# Patient Record
Sex: FEMALE | Race: WHITE | NOT HISPANIC OR LATINO | Employment: FULL TIME | URBAN - METROPOLITAN AREA
[De-identification: names, ages, dates, MRNs, and addresses within clinical notes are randomized per-mention and may not be internally consistent; named-entity substitution may affect disease eponyms.]

---

## 2018-07-11 ENCOUNTER — OFFICE VISIT (OUTPATIENT)
Dept: FAMILY MEDICINE | Facility: CLINIC | Age: 23
End: 2018-07-11
Payer: COMMERCIAL

## 2018-07-11 VITALS
TEMPERATURE: 99 F | BODY MASS INDEX: 30.66 KG/M2 | DIASTOLIC BLOOD PRESSURE: 80 MMHG | HEART RATE: 78 BPM | SYSTOLIC BLOOD PRESSURE: 130 MMHG | OXYGEN SATURATION: 98 % | WEIGHT: 173.06 LBS | HEIGHT: 63 IN

## 2018-07-11 DIAGNOSIS — D84.9 IMMUNODEFICIENCY: Primary | ICD-10-CM

## 2018-07-11 PROCEDURE — 99999 PR PBB SHADOW E&M-EST. PATIENT-LVL III: CPT | Mod: PBBFAC,,, | Performed by: FAMILY MEDICINE

## 2018-07-11 PROCEDURE — 90471 IMMUNIZATION ADMIN: CPT | Mod: S$GLB,,, | Performed by: FAMILY MEDICINE

## 2018-07-11 PROCEDURE — 99395 PREV VISIT EST AGE 18-39: CPT | Mod: 25,S$GLB,, | Performed by: FAMILY MEDICINE

## 2018-07-11 PROCEDURE — 90746 HEPB VACCINE 3 DOSE ADULT IM: CPT | Mod: S$GLB,,, | Performed by: FAMILY MEDICINE

## 2018-07-11 PROCEDURE — 90707 MMR VACCINE SC: CPT | Mod: S$GLB,,, | Performed by: FAMILY MEDICINE

## 2018-07-11 PROCEDURE — 90472 IMMUNIZATION ADMIN EACH ADD: CPT | Mod: S$GLB,,, | Performed by: FAMILY MEDICINE

## 2018-07-11 NOTE — PROGRESS NOTES
Subjective:       Patient ID: Frankie Gallagherpper is a 22 y.o. female.    Chief Complaint: Immunizations and Paperwork    HPI  Review of Systems    Objective:      Physical Exam    Assessment:       1. Immunodeficiency        Plan:       ***

## 2018-07-11 NOTE — PROGRESS NOTES
Subjective:       Patient ID: Frankie Smyth is a 22 y.o. female.    Chief Complaint: Immunizations and Paperwork    PATIENT IS HER FOR NURSING  PHYSICAL AND PLANS TO ATTEND [employer]. SHE HAD TITERS DRAWN, BUT HER PCP DIDN'T HAVE THE VACCINES AVAILABLE FOR HER TO RECEIVE FOR HEPATITIS B AND MMR AS HER TITERS SHOWED SHE WAS NOT IMMUNE.     SHE HAS NO COMPLAINTS TODAY.   History reviewed. No pertinent past medical history.   History reviewed. No pertinent surgical history.  Review of patient's family history indicates:  Problem: Anxiety disorder      Relation: Mother       Age of Onset: (Not Specified)   Problem: Depression      Relation: Mother       Age of Onset: (Not Specified)   Problem: Hypertension      Relation: Mother       Age of Onset: (Not Specified)   Problem: Depression      Relation: Brother       Age of Onset: (Not Specified)   Problem: Breast cancer      Relation: Neg Hx       Age of Onset: (Not Specified)   Problem: Colon cancer      Relation: Neg Hx       Age of Onset: (Not Specified)   Problem: Ovarian cancer      Relation: Neg Hx       Age of Onset: (Not Specified)     Social History    Marital status: Single              Spouse name:                       Years of education:                 Number of children:               Occupational History  Occupation          Employer            Comment                                                       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   Respiratory: Negative for cough, chest tightness and shortness of breath.    Cardiovascular: Negative for chest pain, palpitations and leg swelling.   Gastrointestinal: Negative for abdominal pain.   Neurological: Negative for dizziness, syncope, light-headedness and headaches.       Objective:       ,  Vitals:    07/11/18 1159   BP: 130/80   Pulse: 78   Temp: 99 °F (37.2 °C)   TempSrc: Oral   SpO2: 98%   Weight: 78.5 kg (173 lb 1 oz)   Height: 5' 3" (1.6 m)       Physical Exam   Constitutional: She appears well-developed and well-nourished. No distress.   HENT:   Head: Normocephalic.   Right Ear: External ear normal.   Left Ear: External ear normal.   Mouth/Throat: Oropharynx is clear and moist. No oropharyngeal exudate.   Eyes: Conjunctivae are normal.   Neck: Normal range of motion. Neck supple. No thyromegaly present.   Cardiovascular: Normal rate, regular rhythm and normal heart sounds.  Exam reveals no gallop and no friction rub.    No murmur heard.  Pulmonary/Chest: Effort normal and breath sounds normal. No respiratory distress. She has no wheezes. She has no rales.   Abdominal: Soft. Bowel sounds are normal. She exhibits no distension and no mass. There is no tenderness. There is no rebound and no guarding.   Musculoskeletal: She exhibits no edema.   NORMAL GAIT   Lymphadenopathy:     She has no cervical adenopathy.   Neurological: She is alert.   Skin: No rash noted. She is not diaphoretic.   Psychiatric: She has a normal mood and affect.       Assessment:       1. Immunodeficiency        Plan:       Frankie was seen today for immunizations and paperwork.    Diagnoses and all orders for this visit:    Immunodeficiency  -     (In Office Administered) MMR Vaccine (SQ)  -     (In Office Administered) Hepatitis B Vaccine (Adult) (IM)    SHE NEEDS MMR AND HEP B SHOT TODAY DUE TO LOW TITERS       "

## 2018-08-10 ENCOUNTER — CLINICAL SUPPORT (OUTPATIENT)
Dept: FAMILY MEDICINE | Facility: CLINIC | Age: 23
End: 2018-08-10
Payer: COMMERCIAL

## 2018-08-10 DIAGNOSIS — Z23 NEED FOR HEPATITIS B VACCINATION: Primary | ICD-10-CM

## 2018-08-10 PROCEDURE — 90746 HEPB VACCINE 3 DOSE ADULT IM: CPT | Mod: S$GLB,,, | Performed by: FAMILY MEDICINE

## 2018-08-10 PROCEDURE — 90471 IMMUNIZATION ADMIN: CPT | Mod: S$GLB,,, | Performed by: FAMILY MEDICINE

## 2018-08-10 NOTE — PROGRESS NOTES
Administered Hepatitis B vaccine dose #2 IM to left deltoid.  Patient tolerated injection well, no adverse reactions noted. Patient advised to return for next dose. Patient verbalized understanding.

## 2018-08-16 ENCOUNTER — TELEPHONE (OUTPATIENT)
Dept: FAMILY MEDICINE | Facility: CLINIC | Age: 23
End: 2018-08-16

## 2018-08-16 NOTE — TELEPHONE ENCOUNTER
----- Message from Gerson Boo sent at 8/16/2018  3:50 PM CDT -----  Contact: self  Pt called to schedule her 3rd dose of Hep B injection. Contact pt at [phone].    Thanks-

## 2018-08-16 NOTE — TELEPHONE ENCOUNTER
3rd dose due 01/11/19, unable to schedule so far our.  Advised to call back at a late date to schedule. Patient verbalized understanding.

## 2019-01-11 ENCOUNTER — CLINICAL SUPPORT (OUTPATIENT)
Dept: FAMILY MEDICINE | Facility: CLINIC | Age: 24
End: 2019-01-11
Payer: COMMERCIAL

## 2019-01-11 DIAGNOSIS — Z23 NEED FOR HEPATITIS B VACCINATION: Primary | ICD-10-CM

## 2019-01-11 PROCEDURE — 99499 NO LOS: ICD-10-PCS | Mod: S$GLB,,, | Performed by: FAMILY MEDICINE

## 2019-01-11 PROCEDURE — 90471 HEPATITIS B VACCINE ADULT IM: ICD-10-PCS | Mod: S$GLB,,, | Performed by: FAMILY MEDICINE

## 2019-01-11 PROCEDURE — 99499 UNLISTED E&M SERVICE: CPT | Mod: S$GLB,,, | Performed by: FAMILY MEDICINE

## 2019-01-11 PROCEDURE — 90746 HEPB VACCINE 3 DOSE ADULT IM: CPT | Mod: S$GLB,,, | Performed by: FAMILY MEDICINE

## 2019-01-11 PROCEDURE — 90746 HEPATITIS B VACCINE ADULT IM: ICD-10-PCS | Mod: S$GLB,,, | Performed by: FAMILY MEDICINE

## 2019-01-11 PROCEDURE — 90471 IMMUNIZATION ADMIN: CPT | Mod: S$GLB,,, | Performed by: FAMILY MEDICINE

## 2019-01-11 NOTE — PROGRESS NOTES
Subjective:       Patient ID: Frankie Mayer is a 23 y.o. female.    Chief Complaint: Immunizations    HPI  Review of Systems    Objective:       There were no vitals filed for this visit.    Physical Exam    Assessment:       1. Need for hepatitis B vaccination        Plan:       Frankie was seen today for immunizations.    Diagnoses and all orders for this visit:    Need for hepatitis B vaccination    Other orders  -     (In Office Administered) Hepatitis B Vaccine (Adult) (IM)    \  Hep B order placed

## 2019-01-16 ENCOUNTER — TELEPHONE (OUTPATIENT)
Dept: FAMILY MEDICINE | Facility: CLINIC | Age: 24
End: 2019-01-16

## 2019-01-16 DIAGNOSIS — Z01.84 IMMUNITY TO HEPATITIS B VIRUS DEMONSTRATED BY SEROLOGIC TEST: Primary | ICD-10-CM

## 2019-01-16 NOTE — TELEPHONE ENCOUNTER
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 Please place orders and call to advise. Thank you.

## 2019-02-16 ENCOUNTER — LAB VISIT (OUTPATIENT)
Dept: LAB | Facility: HOSPITAL | Age: 24
End: 2019-02-16
Attending: FAMILY MEDICINE
Payer: COMMERCIAL

## 2019-02-16 DIAGNOSIS — Z01.84 IMMUNITY TO HEPATITIS B VIRUS DEMONSTRATED BY SEROLOGIC TEST: ICD-10-CM

## 2019-02-16 PROCEDURE — 86706 HEP B SURFACE ANTIBODY: CPT

## 2019-02-16 PROCEDURE — 36415 COLL VENOUS BLD VENIPUNCTURE: CPT | Mod: PO

## 2019-02-18 LAB — HBV SURFACE AB SER-ACNC: POSITIVE M[IU]/ML

## 2019-11-04 ENCOUNTER — OFFICE VISIT (OUTPATIENT)
Dept: FAMILY MEDICINE | Facility: CLINIC | Age: 24
End: 2019-11-04
Payer: COMMERCIAL

## 2019-11-04 VITALS
HEIGHT: 63 IN | OXYGEN SATURATION: 99 % | TEMPERATURE: 98 F | HEART RATE: 75 BPM | WEIGHT: 167.56 LBS | BODY MASS INDEX: 29.69 KG/M2 | SYSTOLIC BLOOD PRESSURE: 126 MMHG | DIASTOLIC BLOOD PRESSURE: 70 MMHG

## 2019-11-04 DIAGNOSIS — T78.40XA ALLERGIC REACTION, INITIAL ENCOUNTER: Primary | ICD-10-CM

## 2019-11-04 PROCEDURE — 99214 PR OFFICE/OUTPT VISIT, EST, LEVL IV, 30-39 MIN: ICD-10-PCS | Mod: S$GLB,,, | Performed by: PHYSICIAN ASSISTANT

## 2019-11-04 PROCEDURE — 3008F BODY MASS INDEX DOCD: CPT | Mod: CPTII,S$GLB,, | Performed by: PHYSICIAN ASSISTANT

## 2019-11-04 PROCEDURE — 99214 OFFICE O/P EST MOD 30 MIN: CPT | Mod: S$GLB,,, | Performed by: PHYSICIAN ASSISTANT

## 2019-11-04 PROCEDURE — 99999 PR PBB SHADOW E&M-EST. PATIENT-LVL IV: ICD-10-PCS | Mod: PBBFAC,,, | Performed by: PHYSICIAN ASSISTANT

## 2019-11-04 PROCEDURE — 3008F PR BODY MASS INDEX (BMI) DOCUMENTED: ICD-10-PCS | Mod: CPTII,S$GLB,, | Performed by: PHYSICIAN ASSISTANT

## 2019-11-04 PROCEDURE — 99999 PR PBB SHADOW E&M-EST. PATIENT-LVL IV: CPT | Mod: PBBFAC,,, | Performed by: PHYSICIAN ASSISTANT

## 2019-11-04 RX ORDER — HYDROXYZINE HYDROCHLORIDE 25 MG/1
25 TABLET, FILM COATED ORAL 3 TIMES DAILY PRN
Qty: 30 TABLET | Refills: 0 | Status: SHIPPED | OUTPATIENT
Start: 2019-11-04 | End: 2019-12-04

## 2019-11-04 RX ORDER — DESLORATADINE 5 MG/1
5 TABLET ORAL DAILY
Qty: 30 TABLET | Refills: 11 | Status: SHIPPED | OUTPATIENT
Start: 2019-11-04 | End: 2022-01-26

## 2019-11-04 RX ORDER — METHYLPREDNISOLONE 4 MG/1
TABLET ORAL
Qty: 1 PACKAGE | Refills: 0 | Status: SHIPPED | OUTPATIENT
Start: 2019-11-04 | End: 2022-01-26

## 2019-11-04 NOTE — PROGRESS NOTES
Subjective:       Patient ID: Frankie Smyth is a 24 y.o. female with multiple medical diagnoses as listed in the medical history and problem list that presents for Otalgia  .    Chief Complaint: Otalgia      Otalgia    There is pain in both ears. This is a new problem. The current episode started in the past 7 days (saturday ). The problem has been unchanged. Associated symptoms include hearing loss (muffled left ear ) and a rash. Pertinent negatives include no coughing, diarrhea, ear discharge, rhinorrhea, sore throat or vomiting. Associated symptoms comments: Facial rash as well but that improved . Treatments tried: benadryl at night and HC cream on ear    Rash   This is a new problem. The current episode started yesterday. The problem is unchanged. The affected locations include theleft ear and right ear. The rash is characterized by redness, swelling and itchiness. She was exposed to shellfish and unknown. Pertinent negatives include no anorexia, congestion, cough, diarrhea, eye pain, facial edema, fatigue, fever, joint pain, nail changes, rhinorrhea, shortness of breath, sore throat or vomiting. Past treatments include anti-itch cream, antihistamine and cold compress. The treatment provided mild relief.   she did have shrimp on Friday. None since    Review of Systems   Constitutional: Negative for chills, fatigue and fever.   HENT: Positive for ear pain and hearing loss (muffled left ear ). Negative for congestion, ear discharge, postnasal drip, rhinorrhea, sinus pressure, sinus pain and sore throat.    Eyes: Negative for pain.   Respiratory: Negative for cough and shortness of breath.    Gastrointestinal: Negative for anorexia, diarrhea and vomiting.   Musculoskeletal: Negative for joint pain.   Skin: Positive for rash. Negative for nail changes.         PAST MEDICAL HISTORY:  History reviewed. No pertinent past medical history.    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[employer]   Social Needs    Financial resource strain: Not very hard    Food insecurity:     Worry: Never true     Inability: Never true    Transportation needs:     Medical: No     Non-medical: No   Tobacco Use    Smoking status: Never Smoker   Substance and Sexual Activity    Alcohol use: No     Frequency: 2-4 times a month     Drinks per session: 1 or 2     Binge frequency: Never    Drug use: No    Sexual activity: Yes     Birth control/protection: OCP   Lifestyle    Physical activity:     Days per week: 0 days     Minutes per session: 20 min    Stress: To some extent   Relationships    Social connections:     Talks on phone: Twice a week     Gets together: Once a week     Attends [employer] service: Not on file     Active member of club or organization: No     Attends meetings of clubs or organizations: Never     Relationship status: Never    Other Topics Concern    Not on file   Social History Narrative    Not on file       ALLERGIES AND MEDICATIONS: updated and reviewed.  Review of patient's allergies indicates:  No Known Allergies  Current Outpatient Medications   Medication Sig Dispense Refill    norgestimate-ethinyl estradiol (ESTARYLLA) 0.25-35 mg-mcg per tablet Take 1 tablet by mouth once daily. 84 tablet 4    desloratadine (CLARINEX) 5 mg tablet Take 1 tablet (5 mg total) by mouth once daily. 30 tablet 11    hydrOXYzine HCl (ATARAX) 25 MG tablet Take 1 tablet (25 mg total) by mouth 3 (three) times daily as needed for Itching. 30 tablet 0    methylPREDNISolone (MEDROL DOSEPACK) 4 mg tablet Take as directed 1 Package 0     No current facility-administered medications for this visit.          Objective:   /70   Pulse 75   Temp 98.3 °F (36.8 °C) (Oral)   Ht  "5' 3" (1.6 m)   Wt 76 kg (167 lb 8.8 oz)   LMP 10/30/2019 (Exact Date)   SpO2 99%   BMI 29.68 kg/m²      Physical Exam   Constitutional: She is oriented to person, place, and time.   HENT:   Head: Normocephalic and atraumatic.   Right Ear: No lacerations. There is swelling. No drainage or tenderness. Tympanic membrane is not injected, not scarred and not erythematous. No middle ear effusion. No decreased hearing is noted.   Left Ear: No lacerations. There is swelling. No drainage or tenderness. Tympanic membrane is not injected and not scarred.  No middle ear effusion. No decreased hearing is noted.   Nose: Nose normal.   Mouth/Throat: Uvula is midline, oropharynx is clear and moist and mucous membranes are normal.   Eyes: Conjunctivae and EOM are normal.   Cardiovascular: Normal rate and regular rhythm.   Pulmonary/Chest: Effort normal and breath sounds normal.   Neurological: She is alert and oriented to person, place, and time.   Skin: Skin is warm. No erythema.           Assessment:       1. Allergic reaction, initial encounter        Plan:       Allergic reaction, initial encounter  -     desloratadine (CLARINEX) 5 mg tablet; Take 1 tablet (5 mg total) by mouth once daily.  Dispense: 30 tablet; Refill: 11  -     methylPREDNISolone (MEDROL DOSEPACK) 4 mg tablet; Take as directed  Dispense: 1 Package; Refill: 0  -     hydrOXYzine HCl (ATARAX) 25 MG tablet; Take 1 tablet (25 mg total) by mouth 3 (three) times daily as needed for Itching.  Dispense: 30 tablet; Refill: 0  Benadryl at night. Advised 50 mg.   She is to avoid all seafood until the resolved. She can introduce one and see if she reacts within 3 days.   Aware if she has SOB, throat swelling, etc that that is a medical emergency and she will proceed to the ER           No follow-ups on file.  "

## 2019-11-04 NOTE — PROGRESS NOTES
Patient states taken flu vaccine at [facility] on [address] in August of 2019.

## 2021-04-16 ENCOUNTER — PATIENT MESSAGE (OUTPATIENT)
Dept: RESEARCH | Facility: HOSPITAL | Age: 26
End: 2021-04-16

## 2023-03-14 ENCOUNTER — TELEPHONE (OUTPATIENT)
Dept: PRIMARY CARE CLINIC | Facility: CLINIC | Age: 28
End: 2023-03-14
Payer: COMMERCIAL

## 2023-03-14 NOTE — TELEPHONE ENCOUNTER
Called patient, no answer; left a voice message regarding her appointment on 3/23/23 having to be cancelled due to provider not accepting new patients at the time. Sorry, for any inconvenience. Left a message in patient MyChart also.

## 2023-03-29 ENCOUNTER — TELEPHONE (OUTPATIENT)
Dept: INTERNAL MEDICINE | Facility: CLINIC | Age: 28
End: 2023-03-29
Payer: COMMERCIAL

## 2023-03-29 NOTE — TELEPHONE ENCOUNTER
I tired calling the pt to reschedule her appt on 3/30/23 with no answer. A message was left for the pt to return the call to the clinic to reschedule her appt for 3/30/23

## 2025-06-04 NOTE — PROGRESS NOTES
Evita from [facility] called report patient has a fever of 101.8 and blood in urine.    On call provider paged.       Hep-B vaccination administered. Tolerated well, instructed to wait 15 min for observation. No reaction noted at discharge.